# Patient Record
Sex: FEMALE | Race: BLACK OR AFRICAN AMERICAN | URBAN - METROPOLITAN AREA
[De-identification: names, ages, dates, MRNs, and addresses within clinical notes are randomized per-mention and may not be internally consistent; named-entity substitution may affect disease eponyms.]

---

## 2017-01-03 ENCOUNTER — IMPORTED ENCOUNTER (OUTPATIENT)
Dept: URBAN - METROPOLITAN AREA CLINIC 50 | Facility: CLINIC | Age: 47
End: 2017-01-03

## 2017-01-03 NOTE — PATIENT DISCUSSION
"""Informed patient that their cataract(s) are not visually significant or do not meet the criteria for cataract surgery.  Recommended attention to common cataract symptoms

## 2017-01-03 NOTE — PATIENT DISCUSSION
"""Annual Type 1 Diabetic eye exam with dilation. No diabetic retinopathy found. Recommend annual dilated examinations. Patient instructed to call office immediately if sudden changes in vision occur. Emphasized importance of good blood glucose control. Summary of care provided to the physician managing the ongoing diabetes care. """

## 2017-01-03 NOTE — PATIENT DISCUSSION
"""Call if vision decreases or RD warning signs increases/RD warnings given.  No signs of retinal tear

## 2018-01-30 ENCOUNTER — IMPORTED ENCOUNTER (OUTPATIENT)
Dept: URBAN - METROPOLITAN AREA CLINIC 50 | Facility: CLINIC | Age: 48
End: 2018-01-30

## 2020-02-20 ENCOUNTER — IMPORTED ENCOUNTER (OUTPATIENT)
Dept: URBAN - METROPOLITAN AREA CLINIC 50 | Facility: CLINIC | Age: 50
End: 2020-02-20

## 2020-11-10 NOTE — PATIENT DISCUSSION
The patient expressed a desire to see through the full range of vision from distance, to middle, to near without glasses. The limitations of advanced lens technology were reviewed and the recommendation was made for an extended depth of focus lens ([employer]fony) in combination with a multifocal lens. Patient understands that each lens will provide only 2 of the 3 ranges of vision. Side effects, specifically halos, reduced contrast, and a 1 in 500 exchange rate due to failure to adapt to the lens were discussed as was the need for enhancement in some cases. The patient elects to proceed with TMF +325 OD, goal of emmetropia.

## 2021-02-25 ENCOUNTER — IMPORTED ENCOUNTER (OUTPATIENT)
Dept: URBAN - METROPOLITAN AREA CLINIC 50 | Facility: CLINIC | Age: 51
End: 2021-02-25

## 2021-04-23 ASSESSMENT — VISUAL ACUITY
OS_CC: 20/20
OS_BAT: 20/40
OD_CC: J4@ 14 IN
OS_OTHER: 20/20. 20/40.
OS_CC: J1
OD_CC: 20/25
OS_CC: 20/30
OD_CC: 20/25-2
OD_BAT: 20/25
OD_CC: J1
OD_OTHER: 20/25. 20/40.
OS_CC: 20/30-
OS_PH: 20/25-2
OS_OTHER: 20/40. 20/70.
OD_BAT: 20/25
OD_CC: 20/25-1
OS_CC: 20/20-2
OS_CC: J4@ 14 IN
OD_OTHER: 20/25. 20/60.
OS_BAT: 20/20
OD_CC: 20/30

## 2021-04-23 ASSESSMENT — TONOMETRY
OS_IOP_MMHG: 15
OD_IOP_MMHG: 15
OS_IOP_MMHG: 15
OS_IOP_MMHG: 17
OS_IOP_MMHG: 16
OD_IOP_MMHG: 16
OD_IOP_MMHG: 15
OD_IOP_MMHG: 14

## 2022-02-25 ENCOUNTER — PREPPED CHART (OUTPATIENT)
Dept: URBAN - METROPOLITAN AREA CLINIC 50 | Facility: CLINIC | Age: 52
End: 2022-02-25

## 2022-03-03 ENCOUNTER — COMPREHENSIVE EXAM (OUTPATIENT)
Dept: URBAN - METROPOLITAN AREA CLINIC 50 | Facility: CLINIC | Age: 52
End: 2022-03-03

## 2022-03-03 DIAGNOSIS — Z01.01: ICD-10-CM

## 2022-03-03 DIAGNOSIS — H52.4: ICD-10-CM

## 2022-03-03 PROCEDURE — 92014 COMPRE OPH EXAM EST PT 1/>: CPT

## 2022-03-03 PROCEDURE — 92015 DETERMINE REFRACTIVE STATE: CPT

## 2022-03-03 ASSESSMENT — VISUAL ACUITY
OD_GLARE: 20/20
OD_GLARE: 20/40
OD_CC: 20/20-1
OS_GLARE: 20/20
OD_PH: 20/40
OD_CC: J2 @ 14IN
OU_CC: 20/20
OS_PH: 20/30
OS_CC: 20/20-1
OU_CC: J2 @ 14IN
OS_GLARE: 20/25
OS_CC: J2 @ 14IN
OU_SC: 20/50-2
OS_SC: 20/60
OD_SC: 20/60

## 2022-03-03 ASSESSMENT — TONOMETRY
OD_IOP_MMHG: 18
OD_IOP_MMHG: 21
OS_IOP_MMHG: 19
OS_IOP_MMHG: 18

## 2022-03-03 ASSESSMENT — KERATOMETRY
OD_K1POWER_DIOPTERS: 44.25
OS_AXISANGLE2_DEGREES: 90
OD_AXISANGLE2_DEGREES: 90
OD_K2POWER_DIOPTERS: 44.25
OS_AXISANGLE_DEGREES: 0
OS_K1POWER_DIOPTERS: 44.50
OD_AXISANGLE_DEGREES: 0
OS_K2POWER_DIOPTERS: 44.50

## 2022-03-03 NOTE — PATIENT DISCUSSION
No need to start pressure lowering drops at this time. Her pressure has continued to creep up over the years so we will continue to watch her at this time.

## 2024-04-02 ENCOUNTER — COMPREHENSIVE EXAM (OUTPATIENT)
Dept: URBAN - METROPOLITAN AREA CLINIC 50 | Facility: LOCATION | Age: 54
End: 2024-04-02

## 2024-04-02 DIAGNOSIS — H35.89: ICD-10-CM

## 2024-04-02 DIAGNOSIS — E11.3292: ICD-10-CM

## 2024-04-02 DIAGNOSIS — H40.053: ICD-10-CM

## 2024-04-02 DIAGNOSIS — H25.13: ICD-10-CM

## 2024-04-02 PROCEDURE — 92015 DETERMINE REFRACTIVE STATE: CPT

## 2024-04-02 PROCEDURE — 99214 OFFICE O/P EST MOD 30 MIN: CPT

## 2024-04-02 ASSESSMENT — KERATOMETRY
OS_K2POWER_DIOPTERS: 44.50
OD_K1POWER_DIOPTERS: 44.25
OS_K1POWER_DIOPTERS: 43.75
OS_AXISANGLE_DEGREES: 097
OD_K2POWER_DIOPTERS: 44.25
OD_AXISANGLE_DEGREES: 180
OD_AXISANGLE2_DEGREES: 90
OS_AXISANGLE2_DEGREES: 7

## 2024-04-02 ASSESSMENT — VISUAL ACUITY
OD_GLARE: 20/40
OD_GLARE: 20/40
OU_CC: J1+
OS_CC: 20/30
OD_CC: 20/25
OS_GLARE: 20/40
OS_GLARE: 20/40

## 2024-04-02 ASSESSMENT — TONOMETRY
OS_IOP_MMHG: 14
OD_IOP_MMHG: 13

## 2025-04-09 ENCOUNTER — COMPREHENSIVE EXAM (OUTPATIENT)
Age: 55
End: 2025-04-09

## 2025-04-09 DIAGNOSIS — Z01.01: ICD-10-CM

## 2025-04-09 DIAGNOSIS — H52.4: ICD-10-CM

## 2025-04-09 DIAGNOSIS — E11.9: ICD-10-CM

## 2025-04-09 PROCEDURE — 92014 COMPRE OPH EXAM EST PT 1/>: CPT

## 2025-04-09 PROCEDURE — 92015 DETERMINE REFRACTIVE STATE: CPT
